# Patient Record
Sex: FEMALE | Race: OTHER | NOT HISPANIC OR LATINO
[De-identification: names, ages, dates, MRNs, and addresses within clinical notes are randomized per-mention and may not be internally consistent; named-entity substitution may affect disease eponyms.]

---

## 2019-02-28 ENCOUNTER — TRANSCRIPTION ENCOUNTER (OUTPATIENT)
Age: 64
End: 2019-02-28

## 2020-01-06 ENCOUNTER — APPOINTMENT (OUTPATIENT)
Dept: HEART AND VASCULAR | Facility: CLINIC | Age: 65
End: 2020-01-06
Payer: COMMERCIAL

## 2020-01-06 ENCOUNTER — NON-APPOINTMENT (OUTPATIENT)
Age: 65
End: 2020-01-06

## 2020-01-06 VITALS
WEIGHT: 175 LBS | BODY MASS INDEX: 34.36 KG/M2 | HEIGHT: 60 IN | SYSTOLIC BLOOD PRESSURE: 142 MMHG | HEART RATE: 75 BPM | DIASTOLIC BLOOD PRESSURE: 82 MMHG

## 2020-01-06 DIAGNOSIS — G58.8 OTHER SPECIFIED MONONEUROPATHIES: ICD-10-CM

## 2020-01-06 DIAGNOSIS — I07.1 RHEUMATIC TRICUSPID INSUFFICIENCY: ICD-10-CM

## 2020-01-06 DIAGNOSIS — Z82.3 FAMILY HISTORY OF STROKE: ICD-10-CM

## 2020-01-06 DIAGNOSIS — Z87.19 PERSONAL HISTORY OF OTHER DISEASES OF THE DIGESTIVE SYSTEM: ICD-10-CM

## 2020-01-06 DIAGNOSIS — Z01.810 ENCOUNTER FOR PREPROCEDURAL CARDIOVASCULAR EXAMINATION: ICD-10-CM

## 2020-01-06 DIAGNOSIS — K21.9 GASTRO-ESOPHAGEAL REFLUX DISEASE W/OUT ESOPHAGITIS: ICD-10-CM

## 2020-01-06 DIAGNOSIS — E11.9 TYPE 2 DIABETES MELLITUS W/OUT COMPLICATIONS: ICD-10-CM

## 2020-01-06 PROCEDURE — 93306 TTE W/DOPPLER COMPLETE: CPT

## 2020-01-06 PROCEDURE — 99204 OFFICE O/P NEW MOD 45 MIN: CPT | Mod: 25

## 2020-01-06 PROCEDURE — 93000 ELECTROCARDIOGRAM COMPLETE: CPT | Mod: NC

## 2020-01-06 PROCEDURE — ZZZZZ: CPT

## 2020-01-06 RX ORDER — MIRABEGRON 50 MG/1
50 TABLET, FILM COATED, EXTENDED RELEASE ORAL
Refills: 0 | Status: COMPLETED | COMMUNITY
End: 2020-01-06

## 2020-01-06 RX ORDER — ZALEPLON 10 MG/1
10 CAPSULE ORAL
Refills: 0 | Status: ACTIVE | COMMUNITY

## 2020-01-06 RX ORDER — FAMOTIDINE 40 MG/1
40 TABLET, FILM COATED ORAL
Refills: 0 | Status: ACTIVE | COMMUNITY

## 2020-01-06 RX ORDER — DESVENLAFAXINE 50 MG/1
50 TABLET, EXTENDED RELEASE ORAL
Refills: 0 | Status: ACTIVE | COMMUNITY

## 2020-01-06 RX ORDER — PREGABALIN 100 MG/1
100 CAPSULE ORAL
Refills: 0 | Status: ACTIVE | COMMUNITY

## 2020-01-06 NOTE — PHYSICAL EXAM
[General Appearance - Well Developed] : well developed [Normal Appearance] : normal appearance [Well Groomed] : well groomed [No Deformities] : no deformities [General Appearance - Well Nourished] : well nourished [General Appearance - In No Acute Distress] : no acute distress [Normal Conjunctiva] : the conjunctiva exhibited no abnormalities [Normal Oral Mucosa] : normal oral mucosa [Normal Jugular Venous V Waves Present] : normal jugular venous V waves present [Normal Jugular Venous A Waves Present] : normal jugular venous A waves present [No Jugular Venous Morejon A Waves] : no jugular venous morejon A waves [Murmurs] : no murmurs present [Heart Sounds] : normal S1 and S2 [Heart Rate And Rhythm] : heart rate and rhythm were normal [Edema] : no peripheral edema present [] : no respiratory distress [Exaggerated Use Of Accessory Muscles For Inspiration] : no accessory muscle use [Respiration, Rhythm And Depth] : normal respiratory rhythm and effort [Auscultation Breath Sounds / Voice Sounds] : lungs were clear to auscultation bilaterally [Abdomen Tenderness] : non-tender [Abdomen Soft] : soft [Abdomen Mass (___ Cm)] : no abdominal mass palpated [Skin Color & Pigmentation] : normal skin color and pigmentation [Nail Clubbing] : no clubbing of the fingernails [Oriented To Time, Place, And Person] : oriented to person, place, and time [FreeTextEntry1] : uses cane

## 2020-01-06 NOTE — REVIEW OF SYSTEMS
[Headache] : headache [Recent Weight Gain (___ Lbs)] : recent [unfilled] ~Ulb weight gain [Eyeglasses] : currently wearing eyeglasses [Sinus Pressure] : sinus pressure [see HPI] : see HPI [Joint Pain] : joint pain [Under Stress] : under stress [Anxiety] : anxiety [Negative] : Heme/Lymph [Fever] : no fever [Loss Of Hearing] : no hearing loss [Chills] : no chills [Feeling Fatigued] : not feeling fatigued [Memory Lapses Or Loss] : no memory lapses or loss

## 2020-01-06 NOTE — HISTORY OF PRESENT ILLNESS
[FreeTextEntry1] : 64 year old female, non smoker, with PMH of HTN, Thyroid, chronic back chronic back pain with x2 nerve stimulators here to set up cardiac care.\par \par Patient has noted increase in weight secondary to Lyrica and decreased mobility 2/2 to chronic back pain. Patient is considering starting Adipex / Phentermine for weight loss but needed cardiac clearance first for her pain specialist. She is also visiting a neurologist tomorrow, regarding her nerve stimulaters and might need a cardiac clearance in the near future. \par \par Denies any chest pain, shortness of breath, palpations, dizziness or loss of consciousness. Patient is very limited to movement 2/2 chronic pain. Patient tries to walk 5 times a week very slowly. She can walk about 0.5 mile stopping only secondary to pain of lower back. Patient is conscious about diet, avoiding high fat, sugar or carbohydrates. Otherwise she has no complaints. \par \par Denies chest pain, palpitations, dizziness, shortness of breath, dyspnea on exertion or claudications. \par \par

## 2020-01-06 NOTE — DISCUSSION/SUMMARY
[FreeTextEntry1] : EKG:NSR,WNL\par ECHO: mild TR, normal LVEF\par cont Norvasc for hptn; states lipids normal\par There is no cardiac contraindication to using Adipex- her BP should be monitored to check for hypertensive episodes\par Her cardiac status is stable enough to permit removal of nerve stimulators

## 2022-04-07 ENCOUNTER — APPOINTMENT (OUTPATIENT)
Dept: HEART AND VASCULAR | Facility: CLINIC | Age: 67
End: 2022-04-07

## 2023-03-23 ENCOUNTER — NON-APPOINTMENT (OUTPATIENT)
Age: 68
End: 2023-03-23

## 2023-04-04 ENCOUNTER — APPOINTMENT (OUTPATIENT)
Dept: HEART AND VASCULAR | Facility: CLINIC | Age: 68
End: 2023-04-04
Payer: COMMERCIAL

## 2023-04-04 ENCOUNTER — NON-APPOINTMENT (OUTPATIENT)
Age: 68
End: 2023-04-04

## 2023-04-04 VITALS
OXYGEN SATURATION: 98 % | SYSTOLIC BLOOD PRESSURE: 112 MMHG | WEIGHT: 138 LBS | BODY MASS INDEX: 27.09 KG/M2 | HEART RATE: 81 BPM | DIASTOLIC BLOOD PRESSURE: 70 MMHG | HEIGHT: 60 IN

## 2023-04-04 DIAGNOSIS — I27.20 PULMONARY HYPERTENSION, UNSPECIFIED: ICD-10-CM

## 2023-04-04 DIAGNOSIS — I10 ESSENTIAL (PRIMARY) HYPERTENSION: ICD-10-CM

## 2023-04-04 DIAGNOSIS — R00.2 PALPITATIONS: ICD-10-CM

## 2023-04-04 DIAGNOSIS — R00.0 TACHYCARDIA, UNSPECIFIED: ICD-10-CM

## 2023-04-04 PROCEDURE — 99204 OFFICE O/P NEW MOD 45 MIN: CPT | Mod: 25

## 2023-04-04 PROCEDURE — 93306 TTE W/DOPPLER COMPLETE: CPT

## 2023-04-04 PROCEDURE — 36415 COLL VENOUS BLD VENIPUNCTURE: CPT

## 2023-04-04 PROCEDURE — 93000 ELECTROCARDIOGRAM COMPLETE: CPT | Mod: 59

## 2023-04-04 RX ORDER — AMLODIPINE BESYLATE 10 MG/1
10 TABLET ORAL DAILY
Qty: 1 | Refills: 1 | Status: ACTIVE | COMMUNITY

## 2023-04-04 RX ORDER — NALTREXONE HYDROCHLORIDE 50 MG/1
TABLET, FILM COATED ORAL
Refills: 0 | Status: DISCONTINUED | COMMUNITY
End: 2023-04-04

## 2023-04-04 RX ORDER — PREGABALIN 100 MG/1
100 CAPSULE ORAL
Refills: 0 | Status: ACTIVE | COMMUNITY

## 2023-04-04 RX ORDER — ARIPIPRAZOLE 10 MG/1
10 TABLET ORAL
Refills: 0 | Status: ACTIVE | COMMUNITY

## 2023-04-04 RX ORDER — LEVOTHYROXINE SODIUM 0.07 MG/1
75 TABLET ORAL DAILY
Qty: 1 | Refills: 0 | Status: ACTIVE | COMMUNITY

## 2023-04-04 RX ORDER — GABAPENTIN 600 MG/1
600 TABLET, COATED ORAL
Refills: 0 | Status: DISCONTINUED | COMMUNITY
End: 2023-04-04

## 2023-04-04 NOTE — PHYSICAL EXAM
[Well Developed] : well developed [Well Nourished] : well nourished [No Acute Distress] : no acute distress [Normal Conjunctiva] : normal conjunctiva [Normal Venous Pressure] : normal venous pressure [No Carotid Bruit] : no carotid bruit [Normal S1, S2] : normal S1, S2 [No Murmur] : no murmur [No Rub] : no rub [No Gallop] : no gallop [Clear Lung Fields] : clear lung fields [Good Air Entry] : good air entry [No Respiratory Distress] : no respiratory distress  [Soft] : abdomen soft [Non Tender] : non-tender [Normal Gait] : normal gait [No Edema] : no edema [No Cyanosis] : no cyanosis [No Clubbing] : no clubbing [No Varicosities] : no varicosities [No Rash] : no rash [Moves all extremities] : moves all extremities [Normal Speech] : normal speech [Alert and Oriented] : alert and oriented

## 2023-04-04 NOTE — DISCUSSION/SUMMARY
[FreeTextEntry1] : EKG:NSR\par TTE; mild pulmonary HPTN,moderate TR\par feel her Sx are probably pulmonary/deconditioning  and her assocaited tachycardia are due deconditioning but will get CCTA/proBNP- will attach monitor for palpitations\par continue norvasc  for HPTN\par needs JERRELL w/u- will refer to Pulmonary

## 2023-04-04 NOTE — REASON FOR VISIT
[Spouse] : spouse [FreeTextEntry1] : 67 year old female with PMHX of HTN, FMII, and Thyroid Disease here to establish cardiac care since 2020\par \par Previous Work Up\par ECHO ( 01/06/2020 ): EF 65%, Mod diastolic Dysfunction\par Persantine Stress Test ( 09/21/2016 ): Normal\par STATES That FOR PAST 4-6 WEEKS HAS BEEN NOTICING gilliam( CAN'T Walk > 1 BLOCK AND WITH Tachycardia WITH EXERTION- ALSO WITH OCCASIONAL PALPITATIONS- NO CP/PND/ORTHOPNEA/EDEMA

## 2023-04-05 LAB — NT-PROBNP SERPL-MCNC: 195 PG/ML

## 2023-04-25 ENCOUNTER — APPOINTMENT (OUTPATIENT)
Dept: HEART AND VASCULAR | Facility: CLINIC | Age: 68
End: 2023-04-25

## 2023-04-26 ENCOUNTER — APPOINTMENT (OUTPATIENT)
Dept: PULMONOLOGY | Facility: CLINIC | Age: 68
End: 2023-04-26
Payer: COMMERCIAL

## 2023-04-26 VITALS
HEART RATE: 103 BPM | HEIGHT: 60 IN | WEIGHT: 133 LBS | OXYGEN SATURATION: 94 % | SYSTOLIC BLOOD PRESSURE: 128 MMHG | DIASTOLIC BLOOD PRESSURE: 83 MMHG | BODY MASS INDEX: 26.11 KG/M2 | TEMPERATURE: 98.2 F

## 2023-04-26 DIAGNOSIS — R06.09 OTHER FORMS OF DYSPNEA: ICD-10-CM

## 2023-04-26 DIAGNOSIS — R93.89 ABNORMAL FINDINGS ON DIAGNOSTIC IMAGING OF OTHER SPECIFIED BODY STRUCTURES: ICD-10-CM

## 2023-04-26 PROCEDURE — 99204 OFFICE O/P NEW MOD 45 MIN: CPT | Mod: 25

## 2023-04-26 PROCEDURE — 94727 GAS DIL/WSHOT DETER LNG VOL: CPT

## 2023-04-26 PROCEDURE — 94010 BREATHING CAPACITY TEST: CPT

## 2023-04-26 PROCEDURE — ZZZZZ: CPT

## 2023-04-26 PROCEDURE — 94729 DIFFUSING CAPACITY: CPT

## 2023-04-26 NOTE — END OF VISIT
[] : Fellow [FreeTextEntry3] : I discussed the case in detail with the patient and the .  The distant exertion could be related to her chronic fatigue syndrome.  The patient is clinically stable at this point I will follow-up on the.  On the sleep study and the CT scan of the chest.  The PFT revealed decrease in the diffusion capacity with normal lung volumes [Time Spent: ___ minutes] : I have spent [unfilled] minutes of time on the encounter.

## 2023-04-26 NOTE — ASSESSMENT
[FreeTextEntry1] : 67 year old female presenting for evaluation of heart racing with minmal exertion. She is a never smoker and found to have elevated PA pressures on ECHO concerned for underlying PH and lung cause of symptms\par \par Reviewed:\par \par CXR 4/26/2023: showed some prominent ariways in RLL and R hilar area possible underlying bronchectasis and air bubble in stomach\par \par \par Patient is a never smoker and her symptoms likely are coming from atrial fibrillation as her watch notices that she jumps to 140s with exertion. Given that CXR was slightly abnormal will get CT chest to look at lung parenchyma and will also do PFT to determine that lung function is not contributor. Favor that these are normal and causes of PH is from diastolic dysfunction or group 2 as this was present in 2020 echo. Will also obtain sleep study given possible afib and underlying chronic fatigue syndrome to evaluate for sleep disordered breathing\par \par Plan:\par - PFT to determine lung cause of symptoms\par - CXR slightly abnormal will get CT chest to see lung parenchyma\par - Sleep study given likely afib and chronic fatigue syndrome for presence so sleep disordered breathing\par - PH likely group 2 as she has diastolic dysfunction on previous echos and some slight pedal edema

## 2023-04-26 NOTE — HISTORY OF PRESENT ILLNESS
[Never] : never [TextBox_4] : 67 year old never smoker presenting to clinic for evaluation of SOB. She states that for the last month when she walks about a block she has signifiacnt feeling of her heart racing causing her to have to stop to let it calm down and then can continue. She does not have much SOB with this and denies wheezing, coughing, or sputum production. She was seen by cardiology who advised that she have her lungs checked. As echo was done that showed mild PH but otherwise normal function. She does have cats which she has had for many years. Worked for non for [employer] but has been retired due to chronic fatigue syndrome for 30 years. No hx of lung disease in her or her family.

## 2023-06-02 ENCOUNTER — OUTPATIENT (OUTPATIENT)
Dept: OUTPATIENT SERVICES | Facility: HOSPITAL | Age: 68
LOS: 1 days | End: 2023-06-02
Payer: MEDICARE

## 2023-06-02 ENCOUNTER — APPOINTMENT (OUTPATIENT)
Dept: SLEEP CENTER | Facility: HOME HEALTH | Age: 68
End: 2023-06-02
Payer: COMMERCIAL

## 2023-06-02 PROCEDURE — 95800 SLP STDY UNATTENDED: CPT | Mod: 26

## 2023-06-02 PROCEDURE — 95800 SLP STDY UNATTENDED: CPT

## 2023-06-05 DIAGNOSIS — G47.33 OBSTRUCTIVE SLEEP APNEA (ADULT) (PEDIATRIC): ICD-10-CM

## 2023-06-19 ENCOUNTER — TRANSCRIPTION ENCOUNTER (OUTPATIENT)
Age: 68
End: 2023-06-19